# Patient Record
Sex: FEMALE | Race: WHITE
[De-identification: names, ages, dates, MRNs, and addresses within clinical notes are randomized per-mention and may not be internally consistent; named-entity substitution may affect disease eponyms.]

---

## 2018-10-12 ENCOUNTER — HOSPITAL ENCOUNTER (EMERGENCY)
Dept: HOSPITAL 25 - UCCORT | Age: 33
Discharge: LEFT BEFORE DISCHARGE | End: 2018-10-12
Payer: COMMERCIAL

## 2018-10-12 VITALS — DIASTOLIC BLOOD PRESSURE: 52 MMHG | SYSTOLIC BLOOD PRESSURE: 114 MMHG

## 2018-10-12 DIAGNOSIS — N61.1: Primary | ICD-10-CM

## 2018-10-12 DIAGNOSIS — F17.210: ICD-10-CM

## 2018-10-12 PROCEDURE — 99212 OFFICE O/P EST SF 10 MIN: CPT

## 2018-10-12 PROCEDURE — G0463 HOSPITAL OUTPT CLINIC VISIT: HCPCS

## 2018-10-12 NOTE — ED
Skin Complaint





- HPI Summary


HPI Summary: 





32 yr old female with the complaint of left breast pain, redness, swelling, 

fever.  The patient states that she is two weeks post partum, not breast feeding

, she has pain in the above area for past few days. Went to [place] [facility] last 

night and left.   Pain is 8/10,  She took tylenol and motrin prior to coming 

here.  She has had temp 100.4 today.  





- History of Current Complaint


Chief Complaint: UCSkin


Time Seen by Provider: 10/12/18 20:11


Stated Complaint: PERSONAL


Hx Last Menstrual Period: unknown


Pain Intensity: 10





- Allergy/Home Medications


Allergies/Adverse Reactions: 


 Allergies











Allergy/AdvReac Type Severity Reaction Status Date / Time


 


No Known Allergies Allergy   Verified 10/12/18 20:02











Home Medications: 


 Home Medications





Acetaminophen TAB* [Tylenol TAB*] 650 mg PO Q4H PRN 10/12/18 [History Confirmed 

10/12/18]


Amoxicillin PO (*) [Amoxicillin 500 MG CAP*] 500 mg PO Q12H 10/12/18 [History 

Confirmed 10/12/18]


Ibuprofen TAB* [Motrin TAB* 600 MG] 600 mg PO Q8H PRN 10/12/18 [History 

Confirmed 10/12/18]











PMH/Surg Hx/FS Hx/Imm Hx


Respiratory History: Reports: Hx Asthma





- Surgical History


Surgery Procedure, Year, and Place: umbilical hernia repair 2007


Infectious Disease History: No


Infectious Disease History: 


   Denies: Traveled Outside the [place] in Last 30 Days





- Family History


Known Family History: 


   Negative: Other - NO JOINT LAXITY





- Social History


Alcohol Use: None


Substance Use Type: Reports: Excessive Caffeine


Smoking Status (MU): Heavy Every Day Tobacco Smoker


Type: Cigarettes


Amount Used/How Often: 1ppd


Length of Time of Smoking/Using Tobacco: since age 13


Have You Smoked in the Last Year: Yes





Review of Systems


Positive: Fever, Chills


Positive: Other - breast abscess


All Other Systems Reviewed And Are Negative: Yes





Physical Exam


Triage Information Reviewed: Yes


Vital Signs On Initial Exam: 


 Initial Vitals











Temp Pulse Resp BP Pulse Ox


 


 99.2 F   116   15   114/52   99 


 


 10/12/18 19:56  10/12/18 19:56  10/12/18 19:56  10/12/18 19:56  10/12/18 19:56











Vital Signs Reviewed: Yes


Appearance: Positive: Well-Appearing, No Pain Distress


Skin: Positive: Other - left breast with mass of about 3-4 inches firm, pus 

coming from nipple, cellulitis to breast as well.


Head/Face: Positive: Normal Head/Face Inspection


Eyes: Positive: EOMI


ENT: Positive: Normal ENT inspection


Neck: Positive: Supple, Nontender


Respiratory/Lung Sounds: Positive: Clear to Auscultation, Breath Sounds Present


Cardiovascular: Positive: Tachycardia


Abdomen Description: Negative: Distended


Musculoskeletal: Positive: Strength/ROM Intact


Neurological: Positive: Sensory/Motor Intact, Alert, Oriented to Person Place, 

Time, CN Intact II-III, Normal Gait, Speech Normal


Psychiatric: Positive: Normal





- [place] Coma Scale


Best Eye Response: 4 - Spontaneous


Best Motor Response: 6 - Obeys Commands


Best Verbal Response: 5 - Oriented


Coma Scale Total: 15





Diagnostics





- Vital Signs


 Vital Signs











  Temp Pulse Resp BP Pulse Ox


 


 10/12/18 19:56  99.2 F  116  15  114/52  99














- Laboratory


Lab Statement: Any lab studies that have been ordered have been reviewed, and 

results considered in the medical decision making process.





Course/Dx





- Course


Course Of Treatment: I Have contacted [facility] Latisha Lyn NP and 

notified them the patient is signing ama with refusal of ALS transport.  She is 

driving by private car.  I notified [facility] of my suspicion of possible 

breast abscess/ sepsis.





- Diagnoses


Provider Diagnoses: 


 Mastitis, Abscess of breast








Discharge





- Sign-Out/Discharge


Documenting (check all that apply): Patient Departure


All imaging exams completed and their final reports reviewed: No Studies





- Discharge Plan


Condition: Good


Disposition: AGAINST MEDICAL ADVICE


Referrals: 


Cristina Lovett MD [Primary Care Provider] - 





- Billing Disposition and Condition


Condition: GOOD


Disposition: Against Medical Advice

## 2019-10-26 ENCOUNTER — HOSPITAL ENCOUNTER (EMERGENCY)
Dept: HOSPITAL 25 - UCCORT | Age: 34
Discharge: HOME | End: 2019-10-26
Payer: COMMERCIAL

## 2019-10-26 VITALS — DIASTOLIC BLOOD PRESSURE: 67 MMHG | SYSTOLIC BLOOD PRESSURE: 106 MMHG

## 2019-10-26 DIAGNOSIS — R05: ICD-10-CM

## 2019-10-26 DIAGNOSIS — J98.8: ICD-10-CM

## 2019-10-26 DIAGNOSIS — J06.9: Primary | ICD-10-CM

## 2019-10-26 DIAGNOSIS — F17.210: ICD-10-CM

## 2019-10-26 PROCEDURE — 99212 OFFICE O/P EST SF 10 MIN: CPT

## 2019-10-26 PROCEDURE — G0463 HOSPITAL OUTPT CLINIC VISIT: HCPCS

## 2019-10-26 NOTE — UC
Throat Pain/Nasal Rusty HPI





- HPI Summary


HPI Summary: 


C/O sore throat since yesterday. More severe today with some nasal congestion 

and cough.





- History of Current Complaint


Chief Complaint: UCRespiratory


Stated Complaint: SORE THROAT


Time Seen by Provider: 10/26/19 17:47


Hx Obtained From: Patient


Hx Last Menstrual Period: unknown


Pregnant?: No


Onset/Duration: Sudden Onset, Lasting Days - 2, Worse Since - today


Severity: Moderate


Pain Intensity: 5


Cough: Nonproductive


Associated Signs & Symptoms: Positive: Dysphagia, Nasal Discharge, Fever.  

Negative: Wheezing





- Allergies/Home Medications


Allergies/Adverse Reactions: 


 Allergies











Allergy/AdvReac Type Severity Reaction Status Date / Time


 


No Known Allergies Allergy   Verified 10/26/19 17:33











Home Medications: 


 Home Medications





Aspirin/Acetaminophen/Caffeine [Excedrin Migraine Caplet] 1 tab BID PRN 10/26/

19 [History Confirmed 10/26/19]











PMH/Surg Hx/FS Hx/Imm Hx


Cardiovascular History: Cardiac Disease


Respiratory History: Asthma


GI/ History: Gastroesophageal Reflux





- Surgical History


Surgical History: Yes


Surgery Procedure, Year, and Place: umbilical hernia repair 2007.  hysterectomy





- Family History


Known Family History: Positive: Cardiac Disease, Hypertension, Diabetes


   Negative: Other - NO JOINT LAXITY





- Social History


Occupation: Works From/At Home


Lives: With Family


Alcohol Use: None


Substance Use Type: None


Smoking Status (MU): Heavy Every Day Tobacco Smoker


Type: Cigarettes


Amount Used/How Often: 1 ppd


Length of Time of Smoking/Using Tobacco: since age 13


Have You Smoked in the Last Year: Yes





- Immunization History


Vaccination Up to Date: Yes





Review of Systems


All Other Systems Reviewed And Are Negative: Yes


Constitutional: Positive: Fever


ENT: Positive: Sore Throat, Nasal Discharge


Respiratory: Positive: Cough





Physical Exam


Triage Information Reviewed: Yes


Appearance: No Pain Distress, Well-Nourished, Ill-Appearing - mild


Vital Signs: 


 Initial Vital Signs











Temp  98.2 F   10/26/19 17:34


 


Pulse  90   10/26/19 17:34


 


Resp  16   10/26/19 17:34


 


BP  106/67   10/26/19 17:34


 


Pulse Ox  100   10/26/19 17:34











Vital Signs Reviewed: Yes


Eyes: Positive: Conjunctiva Inflamed


ENT: Positive: Pharynx normal, Nasal congestion, TMs normal


Neck: Positive: Supple, Nontender, No Lymphadenopathy


Respiratory: Positive: Lungs clear


Cardiovascular: Positive: RRR, No Murmur


Musculoskeletal Exam: Normal


Neurological Exam: Normal


Psychological Exam: Normal


Skin Exam: Normal





Throat Pain/Nasal Course/Dx





- Differential Dx/Diagnosis


Differential Diagnosis/HQI/PQRI: Epiglottitis, Laryngitis, Pharyngitis, URI


Provider Diagnosis: 


 Upper respiratory infection with cough and congestion








Discharge ED





- Sign-Out/Discharge


Documenting (check all that apply): Patient Departure


All imaging exams completed and their final reports reviewed: No Studies





- Discharge Plan


Condition: Stable


Disposition: HOME


Prescriptions: 


predniSONE TAB* [Deltasone 20 MG TAB*] 60 mg PO DAILY #18 tab


Patient Education Materials:  Upper Respiratory Infection (DC)


Referrals: 


Cristina Lovett MD [Primary Care Provider] - 


Additional Instructions: 


Start Coldeeze zinc lozenges tonight for the sore throat, congestion and cough. 

If the swallowing gets worse then  and start the prednisone





- Billing Disposition and Condition


Condition: STABLE


Disposition: Home

## 2019-11-14 ENCOUNTER — HOSPITAL ENCOUNTER (EMERGENCY)
Dept: HOSPITAL 25 - UCCORT | Age: 34
Discharge: HOME | End: 2019-11-14
Payer: COMMERCIAL

## 2019-11-14 VITALS — DIASTOLIC BLOOD PRESSURE: 70 MMHG | SYSTOLIC BLOOD PRESSURE: 111 MMHG

## 2019-11-14 DIAGNOSIS — F17.210: ICD-10-CM

## 2019-11-14 DIAGNOSIS — K08.89: Primary | ICD-10-CM

## 2019-11-14 DIAGNOSIS — K00.7: ICD-10-CM

## 2019-11-14 PROCEDURE — G0463 HOSPITAL OUTPT CLINIC VISIT: HCPCS

## 2019-11-14 PROCEDURE — 99212 OFFICE O/P EST SF 10 MIN: CPT

## 2019-11-14 NOTE — UC
UC Dental HPI





- HPI Summary


HPI Summary: 





Pt c/o right side jaw and ear pain that radiates down neck X 4 days. Pt has 

been taking tylenol and ibuprofen with no improvement in pain 





- History of Current Complaint


Chief Complaint: UCRespiratory


Stated Complaint: EAR PAIN/DENTAL PAIN


Time Seen by Provider: 11/14/19 20:20


Hx Obtained From: Patient


Hx Last Menstrual Period: unknown


Pregnant?: No


Onset/Duration: Gradual Onset, Lasting Days, Still Present


Severity: Moderate


Pain Intensity: 7


Aggravating Factor(s): Chewing


Alleviating Factor(s): Nothing


Related History: Swelling





- Allergies/Home Medications


Allergies/Adverse Reactions: 


 Allergies











Allergy/AdvReac Type Severity Reaction Status Date / Time


 


No Known Allergies Allergy   Verified 11/14/19 20:09














PMH/Surg Hx/FS Hx/Imm Hx


Previously Healthy: Yes





- Surgical History


Surgical History: Yes


Surgery Procedure, Year, and Place: umbilical hernia repair 2007.  hysterectomy





- Family History


Known Family History: Positive: Cardiac Disease, Hypertension, Diabetes


   Negative: Other - NO JOINT LAXITY





- Social History


Occupation: Unemployed


Lives: With Family


Alcohol Use: Rare


Substance Use Type: None


Smoking Status (MU): Heavy Every Day Tobacco Smoker


Type: Cigarettes


Amount Used/How Often: 1 ppd


Length of Time of Smoking/Using Tobacco: since age 13


Have You Smoked in the Last Year: Yes





- Immunization History


Vaccination Up to Date: Yes





Review of Systems


All Other Systems Reviewed And Are Negative: Yes


Constitutional: Positive: Negative


Skin: Positive: Negative


Eyes: Positive: Negative


ENT: Positive: Dental Pain, Ear Ache


Respiratory: Positive: Negative


Cardiovascular: Positive: Negative


Gastrointestinal: Positive: Negative


Genitourinary: Positive: Negative


Motor: Positive: Negative


Neurovascular: Positive: Negative


Musculoskeletal: Positive: Negative


Neurological: Positive: Negative


Psychological: Positive: Negative


Is Patient Immunocompromised?: No





Physical Exam


Triage Information Reviewed: Yes


Appearance: Pain Distress


Vital Signs: 


 Initial Vital Signs











Temp  97.7 F   11/14/19 20:10


 


Pulse  90   11/14/19 20:10


 


Resp  16   11/14/19 20:10


 


BP  111/70   11/14/19 20:10


 


Pulse Ox  100   11/14/19 20:10











Vital Signs Reviewed: Yes


Eye Exam: Normal


ENT Exam: Normal


ENT: Positive: Normal ENT inspection


Dental: Positive: Percussion Tenderness @, Gross Decay/Caries @


Neck exam: Normal


Respiratory Exam: Normal


Respiratory: Positive: No respiratory distress


Musculoskeletal Exam: Normal


Neurological Exam: Normal


Psychological Exam: Normal


Skin Exam: Normal





Dental Complaint Course/Dx





- Differential Dx/Diagnosis


Differential Diagnosis/Dx: Dental Abscess, Dental Caries


Provider Diagnosis: 


 Pain, dental, Poor dentition








Discharge ED





- Sign-Out/Discharge


Documenting (check all that apply): Patient Departure


All imaging exams completed and their final reports reviewed: No Studies





- Discharge Plan


Condition: Stable


Disposition: HOME


Prescriptions: 


Clindamycin Cap(NF) [Clindamycin Cap 300 mg Cap(NF)] 300 mg PO Q8H #30 cap


Patient Education Materials:  Toothache (ED)


Referrals: 


Cristina Lovett MD [Primary Care Provider] - 


Additional Instructions: 


Please follow up with your dental care provider as soon as possible. 





- Billing Disposition and Condition


Condition: STABLE


Disposition: Home